# Patient Record
Sex: MALE | Race: WHITE | NOT HISPANIC OR LATINO | URBAN - METROPOLITAN AREA
[De-identification: names, ages, dates, MRNs, and addresses within clinical notes are randomized per-mention and may not be internally consistent; named-entity substitution may affect disease eponyms.]

---

## 2024-08-06 ENCOUNTER — DOCUMENTATION (OUTPATIENT)
Dept: SURGERY | Facility: HOSPITAL | Age: 69
End: 2024-08-06

## 2024-08-06 NOTE — PROGRESS NOTES
This is a summary of several emails between the Stefania and myself. Ultimately he has a broken ground wire and is currently using a patch system. He reportedly will come up for repair at some point.

## 2025-04-14 ENCOUNTER — HOSPITAL ENCOUNTER (OUTPATIENT)
Dept: RADIOLOGY | Facility: HOSPITAL | Age: 70
Discharge: HOME | End: 2025-04-14
Payer: COMMERCIAL

## 2025-04-14 ENCOUNTER — APPOINTMENT (OUTPATIENT)
Dept: SURGERY | Facility: CLINIC | Age: 70
End: 2025-04-14
Payer: COMMERCIAL

## 2025-04-14 VITALS
HEIGHT: 72 IN | WEIGHT: 248 LBS | SYSTOLIC BLOOD PRESSURE: 164 MMHG | DIASTOLIC BLOOD PRESSURE: 97 MMHG | BODY MASS INDEX: 33.59 KG/M2 | HEART RATE: 72 BPM

## 2025-04-14 DIAGNOSIS — J96.10 CHRONIC NEUROMUSCULAR RESPIRATORY FAILURE: Primary | ICD-10-CM

## 2025-04-14 DIAGNOSIS — J96.10 CHRONIC NEUROMUSCULAR RESPIRATORY FAILURE: ICD-10-CM

## 2025-04-14 PROCEDURE — 71046 X-RAY EXAM CHEST 2 VIEWS: CPT

## 2025-04-14 PROCEDURE — 71046 X-RAY EXAM CHEST 2 VIEWS: CPT | Performed by: RADIOLOGY

## 2025-04-14 PROCEDURE — 99205 OFFICE O/P NEW HI 60 MIN: CPT | Performed by: NURSE PRACTITIONER

## 2025-04-14 PROCEDURE — 3008F BODY MASS INDEX DOCD: CPT | Performed by: NURSE PRACTITIONER

## 2025-04-14 PROCEDURE — 1159F MED LIST DOCD IN RCRD: CPT | Performed by: NURSE PRACTITIONER

## 2025-04-14 PROCEDURE — 1036F TOBACCO NON-USER: CPT | Performed by: NURSE PRACTITIONER

## 2025-04-14 RX ORDER — IBUPROFEN 800 MG/1
TABLET ORAL
COMMUNITY
Start: 2024-09-27

## 2025-04-14 RX ORDER — UBIDECARENONE 30 MG
CAPSULE ORAL
COMMUNITY

## 2025-04-14 NOTE — PROGRESS NOTES
Subjective   Patient ID: Shan Chaudhary is a 69 y.o. male.    HPI Mr Chaudhary came in today accompanied by his wife. He has ALS and was implanted with DP 9/15/2021. He has used pacer since that time. His ALS has progressed and he reports more decline in 2024 than all previous years combined. He came in and is walking with a cane. He is in no distress, no paradoxical or accessory muscle breathing. His speech is very garbled and only about 10% understandable with first pass. He continues to eat all foods but will avoid salads and nuts. He does have NIV but is not consistently using. He has a broken L2 electrode.     Review of Systems    Objective   Physical Exam  Cardiovascular:      Rate and Rhythm: Normal rate.   Pulmonary:      Effort: Pulmonary effort is normal.   Abdominal:      Palpations: Abdomen is soft.      Comments: Wire sites intact.    Musculoskeletal:      Comments: Weakness in legs, muscle wasting in hands - still functional   Skin:     General: Skin is warm and dry.   Neurological:      Mental Status: He is alert and oriented to person, place, and time.         Assessment/Plan   Diagnoses and all orders for this visit:  Chronic neuromuscular respiratory failure  -     XR chest 2 views; Future  -     XR chest 2 views; Future  69 year old ALS patient who was implanted 3 years 7 months ago. His EMG continues to look good. There is no decline in EMG. CXR shows no diaphragm elevation. The pacers were programmed with increase power settings. The R2 electrode was repaired. The wires working well at end of appointment. We will follow patient along as needed.